# Patient Record
Sex: FEMALE | Race: WHITE | URBAN - METROPOLITAN AREA
[De-identification: names, ages, dates, MRNs, and addresses within clinical notes are randomized per-mention and may not be internally consistent; named-entity substitution may affect disease eponyms.]

---

## 2020-01-10 ENCOUNTER — OFFICE VISIT (OUTPATIENT)
Dept: NEUROLOGY | Age: 60
End: 2020-01-10

## 2020-01-10 VITALS
HEART RATE: 80 BPM | HEIGHT: 66 IN | OXYGEN SATURATION: 97 % | SYSTOLIC BLOOD PRESSURE: 138 MMHG | RESPIRATION RATE: 18 BRPM | BODY MASS INDEX: 30.79 KG/M2 | WEIGHT: 191.6 LBS | DIASTOLIC BLOOD PRESSURE: 80 MMHG | TEMPERATURE: 98.4 F

## 2020-01-10 DIAGNOSIS — R20.9 CVA, OLD, ALTERATIONS OF SENSATIONS: ICD-10-CM

## 2020-01-10 DIAGNOSIS — G62.9 NEUROPATHY: ICD-10-CM

## 2020-01-10 DIAGNOSIS — F39 MOOD DISORDER (HCC): ICD-10-CM

## 2020-01-10 DIAGNOSIS — F41.1 GAD (GENERALIZED ANXIETY DISORDER): ICD-10-CM

## 2020-01-10 DIAGNOSIS — R51.9 HEADACHE DISORDER: ICD-10-CM

## 2020-01-10 DIAGNOSIS — R41.82 ALTERED MENTAL STATUS, UNSPECIFIED ALTERED MENTAL STATUS TYPE: Primary | ICD-10-CM

## 2020-01-10 DIAGNOSIS — R41.3 AMNESIA: ICD-10-CM

## 2020-01-10 DIAGNOSIS — I69.398 CVA, OLD, ALTERATIONS OF SENSATIONS: ICD-10-CM

## 2020-01-10 DIAGNOSIS — G47.9 SLEEP DISORDER: ICD-10-CM

## 2020-01-10 RX ORDER — METFORMIN HYDROCHLORIDE 1000 MG/1
TABLET, FILM COATED, EXTENDED RELEASE ORAL
COMMUNITY

## 2020-01-10 RX ORDER — BUTALBITAL, ACETAMINOPHEN AND CAFFEINE 50; 325; 40 MG/1; MG/1; MG/1
1 TABLET ORAL
Qty: 30 TAB | Refills: 1 | Status: SHIPPED | OUTPATIENT
Start: 2020-01-10

## 2020-01-10 RX ORDER — TRAZODONE HYDROCHLORIDE 50 MG/1
50 TABLET ORAL
Qty: 30 TAB | Refills: 1 | Status: SHIPPED | OUTPATIENT
Start: 2020-01-10

## 2020-01-10 RX ORDER — LORAZEPAM 2 MG/1
TABLET ORAL
Qty: 2 TAB | Refills: 0 | Status: SHIPPED | OUTPATIENT
Start: 2020-01-10

## 2020-01-10 RX ORDER — ATORVASTATIN CALCIUM 80 MG/1
TABLET, FILM COATED ORAL
COMMUNITY
Start: 2018-10-09

## 2020-01-10 RX ORDER — PREGABALIN 300 MG/1
CAPSULE ORAL
Refills: 0 | COMMUNITY
Start: 2019-11-21

## 2020-01-10 RX ORDER — BUSPIRONE HYDROCHLORIDE 5 MG/1
5 TABLET ORAL
Qty: 90 TAB | Refills: 1 | Status: SHIPPED | OUTPATIENT
Start: 2020-01-10

## 2020-01-10 RX ORDER — CITALOPRAM 40 MG/1
40 TABLET, FILM COATED ORAL
COMMUNITY
Start: 2018-10-09

## 2020-01-10 RX ORDER — GABAPENTIN 100 MG/1
100 CAPSULE ORAL 3 TIMES DAILY
Qty: 90 CAP | Refills: 1 | Status: SHIPPED | OUTPATIENT
Start: 2020-01-10

## 2020-01-10 RX ORDER — ASPIRIN 81 MG/1
TABLET ORAL
COMMUNITY

## 2020-01-10 RX ORDER — GLIPIZIDE 2.5 MG/1
TABLET, EXTENDED RELEASE ORAL
COMMUNITY
Start: 2019-11-26

## 2020-01-10 NOTE — PATIENT INSTRUCTIONS
All test results will be discussed at the next appointment. MRI of the Head: About This Test  What is it? MRI (magnetic resonance imaging) is a test that uses a magnetic field and pulses of radio wave energy to make pictures of the organs and structures inside the body. An MRI of the head can give your doctor information about your brain, eyes, ears, and nerves. When you have an MRI, you lie on a table and the table moves into the MRI machine. Why is this test done? An MRI of the head can help find problems such as tumors and infection. It also can check symptoms of a head injury or of diseases such as multiple sclerosis (MS) and stroke. How can you prepare for the test?  Talk to your doctor about all your health conditions before the test. For example, tell your doctor if:  · You are allergic to any medicines. · You are or might be pregnant. · You have a pacemaker, an artificial limb, any metal pins or metal parts in your body, metal heart valves, metal clips in your brain, metal implants in your ears, or any other implanted or prosthetic medical device. · You have an intrauterine device (IUD) in place. · You get nervous in confined spaces. You may need medicine to help you relax. · You wear a patch that contains medicine. · You have kidney disease. What happens before the test?  · You will remove all metal objects, such as hearing aids, dentures, jewelry, watches, and hairpins. · You may need to take off some of your clothes. You will be given a gown to wear during the test. If you do leave some clothes on, make sure you take everything out of your pockets. · You may have contrast material (dye) put into your arm through a tube called an IV. Contrast material helps doctors see specific organs, blood vessels, and most tumors. What happens during the test?  · You will lie on your back on a table that is part of the MRI scanner.  Your head, chest, and arms may be held with straps to help you lie still.  · The table will slide into the space that contains the magnet. A device called a coil may be placed over or wrapped around your head. · Inside the scanner you will hear a fan and feel air moving. You may hear tapping, thumping, or snapping noises. You may be given earplugs or headphones to reduce the noise. · You will be asked to hold still during the scan. You may be asked to hold your breath for short periods. · You may be alone in the scanning room, but a technologist will be watching you through a window and talking with you during the test.  What else should you know about the test?  · An MRI does not hurt. · You may feel warmth in the area being examined. This is normal.  · A dye (contrast material) that contains gadolinium may be used in this test. Be sure to tell your doctor if:  ? You are pregnant or think you may be pregnant. ? You have kidney problems. ? You've had more than one test that used gadolinium. · The U.S. Food and Drug Administration (FDA) has safety warnings about gadolinium. But for most people, the benefit of its use in this test outweighs the risk. · If a dye is used, you may feel a quick sting or pinch and some coolness when the IV is started. The dye may give you a metallic taste in your mouth. Some people feel sick to their stomach or get a headache. · If you breastfeed and are concerned about whether the dye used in this test is safe, talk to your doctor. Most experts believe that very little dye passes into breast milk and even less is passed on to the baby. But if you prefer, you can store some of your breast milk ahead of time and use it for a day or two after the test.  How long does the test take? · The test usually takes 30 to 60 minutes but can take as long as 2 hours. What happens after the test?  · You will probably be able to go home right away, depending on the reason for the test.  · You can go back to your usual activities right away.   Follow-up care is a key part of your treatment and safety. Be sure to make and go to all appointments, and call your doctor if you are having problems. It's also a good idea to keep a list of the medicines you take. Ask your doctor when you can expect to have your test results. Where can you learn more? Go to http://nate-verito.info/. Enter Y181 in the search box to learn more about \"MRI of the Head: About This Test.\"  Current as of: March 28, 2019  Content Version: 12.2  © 6603-2768 [employer]. Care instructions adapted under license by [employer] (which disclaims liability or warranty for this information). If you have questions about a medical condition or this instruction, always ask your healthcare professional. [address] any warranty or liability for your use of this information. Electroencephalogram (EEG): About This Test  What is it? An electroencephalogram (EEG) lets a doctor see the electrical activity of your brain. You will have small pads or patches attached to different places on your head. These are called electrodes. Wires connect the electrodes to a computer. The computer records the activity of the brain. This looks like wavy lines on the computer screen or on paper. Why is this test done? The test is often used to diagnose epilepsy. It helps a doctor know what types of seizures are happening. An EEG can also check brain activity in people with sleep disorders. It can also help a doctor know why a person passed out (lost consciousness). How can you prepare for the test?  · Tell your doctor if you are taking any medicines. Your doctor may ask you to stop taking certain medicines before the test. These include sedatives and tranquilizers, muscle relaxants, sleeping aids, and seizure medicines.   · Do not eat or drink anything with caffeine in it for 12 hours before the test. This includes cola, energy drinks, and chocolate. · Shampoo your hair and rinse with clear water the evening before or the morning of the test. Do not put any hair conditioner or oil on after you wash your hair. · Your doctor may ask you not to sleep the night before the test or to sleep for only about 4 or 5 hours. This is because some types of brain activity can only be seen if you are asleep. If your doctor asks you to get less sleep than normal, plan to have someone drive you to and from the test.  What happens during the test?  · You will lie on your back on a bed or table. Or you might relax in a chair with your eyes closed. · A technologist will attach the electrodes to different places on your head. Or you might get a cap with fixed electrodes on it. · You will lie still with your eyes closed. The technologist will tell you not to talk unless you need to. · The technologist may ask you to:  ? Breathe deeply and rapidly. This is called hyperventilating. ? Look at a bright, flashing light called a strobe. ? Go to sleep. If you can't fall asleep, you may get medicine to help you. What else should you know about the test?  · There is no pain. No electrical current goes through your body. · If you have a seizure disorder such as epilepsy, the flashing lights or hyperventilation may cause a seizure. The technologist is trained to take care of you if this happens. · If electrodes are put in your nose, they may tickle. In rare cases, they can also cause some soreness or a small amount of bleeding for 1 to 2 days after the test.  How long does the test take? · The test will take about 1 to 2 hours. What happens after the test?  · You will probably be able to go home right away. But if you didn't sleep your normal amount before the test, have someone drive you home. · You can go back to your usual activities right away. When should you call for help?   Watch closely for changes in your health, and be sure to contact your doctor if:  · You have any problems that you think may be from the test.  · You have any questions about the test or have not received your results. Follow-up care is a key part of your treatment and safety. Be sure to make and go to all appointments, and call your doctor if you are having problems. It's also a good idea to keep a list of the medicines you take. Ask your doctor when you can expect to have your test results. Where can you learn more? Go to http://nate-verito.info/. Enter F196 in the search box to learn more about \"Electroencephalogram (EEG): About This Test.\"  Current as of: March 28, 2019  Content Version: 12.2  © 1387-8735 [employer]. Care instructions adapted under license by [employer] (which disclaims liability or warranty for this information). If you have questions about a medical condition or this instruction, always ask your healthcare professional. [address] any warranty or liability for your use of this information.

## 2020-01-12 NOTE — PROGRESS NOTES
Neurology Consult Note      HISTORY PROVIDED BY: patient/son    Chief Complaint:   Chief Complaint   Patient presents with    Other     altered mental status    Stroke    New Patient      Subjective: Silvano Chery is a 60 y.o. right handed female who presents in consultation for altered mental status, memory loss. Patient was accompanied by her son to the visit. This is a 78-year-old right-handed white female with history of generalized anxiety disorder, questionable history of cerebrovascular accident, major depressive disorder, headaches, mood disorder, panic attack, who was referred to the clinic to evaluate for altered mental status and stroke. According to the patient and her son, she was told she had a stroke sometime ago, I do not have any records to verify that, however, patient noted that sometime ago she was unable to [facility] with her son to stay with her daughter leaving her . She says she tends to be confused a lot of time, forgetting things, forgetting people's names, and she has been having a lot of panic attack. She says this has escalated since February 2019 when apparently she was told that she had a stroke. Patient says since she moved back to [place] from [place], she has been having a lot of panic attacks, cannot stand noise, having difficulty sleeping. She says she is having frequent headache, headache is throbbing in nature, mostly frontal, occasional sharp pain coming from the back of the head. She says headache is associated with dizziness, blurry vision, occasional nausea. Headache is aggravated by anxiety and lack of sleep. She also noted she is having numbness and tingling sensation mostly in the hands, and sometimes she has difficulty bringing out her words. She denies loss of consciousness, dysphagia, or odynophagia.   Review of Systems - General ROS: positive for  - fatigue, night sweats and sleep disturbance  Psychological ROS: positive for - anxiety, concentration difficulties, depression, disorientation, memory difficulties, mood swings and sleep disturbances  Ophthalmic ROS: positive for - blurry vision, decreased vision and double vision  ENT ROS: positive for - headaches, tinnitus, vertigo and visual changes  Allergy and Immunology ROS: negative  Hematological and Lymphatic ROS: negative  Endocrine ROS: negative  Respiratory ROS: no cough, shortness of breath, or wheezing  Cardiovascular ROS: no chest pain or dyspnea on exertion  Gastrointestinal ROS: no abdominal pain, change in bowel habits, or black or bloody stools  Genito-Urinary ROS: no dysuria, trouble voiding, or hematuria  Musculoskeletal ROS: positive for - gait disturbance, joint pain, joint stiffness, muscle pain and muscular weakness  Neurological ROS: positive for - confusion, dizziness, gait disturbance, headaches, impaired coordination/balance, memory loss, numbness/tingling, speech problems, visual changes and weakness  Dermatological ROS: negative      Past Medical History:   Diagnosis Date    Anxiety disorder     Cerebral artery occlusion with cerebral infarction ([facility] [address]     Depression     Fatigue     Frequent headaches     Incontinence     Ringing in the ears       Past Surgical History:   Procedure Laterality Date    CARDIAC SURG PROCEDURE UNLIST      stents      Social History     Socioeconomic History    Marital status:      Spouse name: Not on file    Number of children: Not on file    Years of education: Not on file    Highest education level: Not on file   Occupational History    Not on file   Social Needs    Financial resource strain: Not on file    Food insecurity:     Worry: Not on file     Inability: Not on file    Transportation needs:     Medical: Not on file     Non-medical: Not on file   Tobacco Use    Smoking status: Former Smoker    Smokeless tobacco: Current User   Substance and Sexual Activity    Alcohol use: Never     Frequency: Never  Drug use: Never    Sexual activity: Not on file   Lifestyle    Physical activity:     Days per week: Not on file     Minutes per session: Not on file    Stress: Not on file   Relationships    Social connections:     Talks on phone: Not on file     Gets together: Not on file     Attends Yarsani service: Not on file     Active member of club or organization: Not on file     Attends meetings of clubs or organizations: Not on file     Relationship status: Not on file    Intimate partner violence:     Fear of current or ex partner: Not on file     Emotionally abused: Not on file     Physically abused: Not on file     Forced sexual activity: Not on file   Other Topics Concern    Not on file   Social History Narrative    Not on file     Family History   Problem Relation Age of Onset    Cancer Mother          Objective:   ROS  As per HPI  No Known Allergies     Meds:  Outpatient Medications Prior to Visit   Medication Sig Dispense Refill    aspirin delayed-release (ASPIR-81) 81 mg tablet 1 tab(s)      atorvastatin (LIPITOR) 80 mg tablet 1 tab(s)      citalopram (CELEXA) 40 mg tablet Take 40 mg by mouth.  glipiZIDE SR (GLUCOTROL XL) 2.5 mg CR tablet 2.5 mg = 1 tab each dose, PO, daily, # 30 tab, 0 Refills, Pharmacy: [facility]      metFORMIN (GLUMETZA) 1,000 mg TG24 24 hour tablet 1 tab(s)      pregabalin (LYRICA) 300 mg capsule TK 1 C PO BID  0     No facility-administered medications prior to visit. Imaging:  MRI Results (most recent):  No results found for this or any previous visit. CT Results (most recent):  No results found for this or any previous visit. Reviewed records in [facility] and smartclip tab today    Lab Review   No results found for this or any previous visit.      Exam:  Visit Vitals  /80 (BP 1 Location: Left arm, BP Patient Position: Sitting)   Pulse 80   Temp 98.4 °F (36.9 °C) (Temporal)   Resp 18   Ht 5' 6\" (1.676 m)   Wt 191 lb 9.6 oz (86.9 kg) SpO2 97%   BMI 30.93 kg/m²     General:  Alert, cooperative, no distress. Head:  Normocephalic, without obvious abnormality, atraumatic. Respiratory:  Heart:   Non labored breathing  Regular rate and rhythm, no murmurs   Neck:   2+ carotids, no bruits   Extremities: Warm, no cyanosis or edema. Pulses: 2+ radial pulses. Neurologic:  MS: Alert and oriented x 4, speech intact. Language intact, able to name, repeat, and follow all commands. Attention and fund of knowledge appropriate. Recent and remote memory intact. Cranial Nerves:  II: visual fields Full to confrontation   II: pupils Equal, round, reactive to light   II: optic disc No papilledema   III,VII: ptosis none   III,IV,VI: extraocular muscles  EOMI, no nystagmus or diplopia   V: facial light touch sensation  normal   VII: facial muscle function   symmetric   VIII: hearing intact   IX: soft palate elevation  normal   XI: trapezius strength  5/5   XI: sternocleidomastoid strength 5/5   XII: tongue  Midline     Motor: normal bulk and tone, no tremor              Strength: 5-/5 throughout, no PD  Sensory: Dysesthesia to LT, PP, temperature and vibration  Coordination: FTN and HTS abnormal, YANETH abnormal,Romberg positive  Gait: Unsteady gait, unable to heel, toe, and tandem walk  Reflexes: 2+ symmetric. Plan: Withdrawn           Assessment/Plan       ICD-10-CM ICD-9-CM    1. Altered mental status, unspecified altered mental status type R41.82 780.97 EEG      MRI BRAIN W WO CONT      RHEUMATOID FACTOR, QL      PROLACTIN      MICHELLE, DIRECT, W/REFLEX      PROTEIN ELECTROPHORESIS      ANGIOTENSIN CONVERTING ENZYME      CK      VITAMIN B6      SED RATE (ESR)      gabapentin (NEURONTIN) 100 mg capsule   2.  Headache disorder R51 784.0 EEG      MRI BRAIN W WO CONT      ALDOLASE      RHEUMATOID FACTOR, QL      PROLACTIN      MICHELLE, DIRECT, W/REFLEX      PROTEIN ELECTROPHORESIS      ANGIOTENSIN CONVERTING ENZYME      CK      VITAMIN B6      SED RATE (ESR) REFERRAL TO NEUROPSYCHOLOGY      REFERRAL TO NEUROPSYCHOLOGY   3. DANNY (generalized anxiety disorder) F41.1 300.02 LORazepam (ATIVAN) 2 mg tablet   4. Mood disorder (HCC) F39 296.90 REFERRAL TO NEUROPSYCHOLOGY      REFERRAL TO NEUROPSYCHOLOGY   5. Amnesia R41.3 780.93 EEG      MRI BRAIN W WO CONT      ALDOLASE      RHEUMATOID FACTOR, QL      PROTEIN ELECTROPHORESIS      ANGIOTENSIN CONVERTING ENZYME      CK      VITAMIN B6      SED RATE (ESR)      REFERRAL TO NEUROPSYCHOLOGY      REFERRAL TO NEUROPSYCHOLOGY   6. Sleep disorder G47.9 780.50    7. CVA, old, alterations of sensations I69.398 438.6 MRI BRAIN W WO CONT    R20.9     8. Neuropathy G62.9 355.9        Follow-up and Dispositions    · Return in about 6 weeks (around 2/21/2020). Plan:  Fioricet 1 tablet p.o. every 6-8 hours PRN for severe headache  Neurontin 100 mg p.o. 3 times daily  BuSpar 5 mg p.o. 3 times daily  Trazodone 50 mg p.o. nightly  MRI of the brain with and without gadolinium, Ativan 2 mg p.o. 30 minutes prior to MRI  EEG  Referred to neuropsychology  Blood for autoimmune work-up, CK, aldolase, vitamin D, ESR, protein serum electrophoresis, prolactin, vitamin B6    Thank you very much for this consultation.   .    Chase Lopez MD  1/10/2020